# Patient Record
Sex: MALE | URBAN - METROPOLITAN AREA
[De-identification: names, ages, dates, MRNs, and addresses within clinical notes are randomized per-mention and may not be internally consistent; named-entity substitution may affect disease eponyms.]

---

## 2022-09-12 ENCOUNTER — APPOINTMENT (RX ONLY)
Dept: URBAN - METROPOLITAN AREA CLINIC 76 | Facility: CLINIC | Age: 62
Setting detail: DERMATOLOGY
End: 2022-09-12

## 2022-09-12 ENCOUNTER — RX ONLY (OUTPATIENT)
Age: 62
Setting detail: RX ONLY
End: 2022-09-12

## 2022-09-12 DIAGNOSIS — L82.1 OTHER SEBORRHEIC KERATOSIS: ICD-10-CM

## 2022-09-12 PROCEDURE — 99202 OFFICE O/P NEW SF 15 MIN: CPT

## 2022-09-12 PROCEDURE — ? COUNSELING

## 2022-09-12 RX ORDER — ADAPALENE 3 MG/G
GEL TOPICAL
Qty: 45 | Refills: 2 | Status: CANCELLED

## 2022-09-12 ASSESSMENT — LOCATION DETAILED DESCRIPTION DERM: LOCATION DETAILED: RIGHT MID-UPPER BACK

## 2022-09-12 ASSESSMENT — LOCATION SIMPLE DESCRIPTION DERM: LOCATION SIMPLE: RIGHT UPPER BACK

## 2022-09-12 ASSESSMENT — LOCATION ZONE DERM: LOCATION ZONE: TRUNK

## 2022-09-12 NOTE — HPI: SKIN LESION
What Type Of Note Output Would You Prefer (Optional)?: Standard Output
How Severe Is Your Skin Lesion?: mild
Has Your Skin Lesion Been Treated?: not been treated
Is This A New Presentation, Or A Follow-Up?: Skin Lesions
Additional History: Patient has some spots on his back he would like looked at

## 2023-07-24 ENCOUNTER — APPOINTMENT (RX ONLY)
Dept: URBAN - METROPOLITAN AREA CLINIC 76 | Facility: CLINIC | Age: 63
Setting detail: DERMATOLOGY
End: 2023-07-24

## 2023-07-24 DIAGNOSIS — F42.4 EXCORIATION (SKIN-PICKING) DISORDER: ICD-10-CM

## 2023-07-24 DIAGNOSIS — L82.1 OTHER SEBORRHEIC KERATOSIS: ICD-10-CM

## 2023-07-24 PROCEDURE — ? COUNSELING

## 2023-07-24 PROCEDURE — 99213 OFFICE O/P EST LOW 20 MIN: CPT

## 2023-07-24 PROCEDURE — ? TREATMENT REGIMEN

## 2023-08-23 ENCOUNTER — APPOINTMENT (RX ONLY)
Dept: URBAN - METROPOLITAN AREA CLINIC 76 | Facility: CLINIC | Age: 63
Setting detail: DERMATOLOGY
End: 2023-08-23

## 2023-08-23 DIAGNOSIS — F42.4 EXCORIATION (SKIN-PICKING) DISORDER: ICD-10-CM | Status: RESOLVING

## 2023-08-23 PROBLEM — L30.9 DERMATITIS, UNSPECIFIED: Status: ACTIVE | Noted: 2023-08-23

## 2023-08-23 PROCEDURE — ? TREATMENT REGIMEN

## 2023-08-23 PROCEDURE — ? COUNSELING

## 2023-08-23 PROCEDURE — 99212 OFFICE O/P EST SF 10 MIN: CPT

## 2023-08-23 NOTE — PROCEDURE: TREATMENT REGIMEN
Plan: Continue naltrexone under the care of his physiologist.  Patient to start using Aquaphor to open sore areas and keep covered with non stick pad.
Detail Level: Zone

## 2023-09-18 ENCOUNTER — APPOINTMENT (RX ONLY)
Dept: URBAN - METROPOLITAN AREA CLINIC 76 | Facility: CLINIC | Age: 63
Setting detail: DERMATOLOGY
End: 2023-09-18

## 2023-09-18 DIAGNOSIS — S31000A OPEN WOUND(S) (MULTIPLE) OF UNSPECIFIED SITE(S), WITHOUT MENTION OF COMPLICATION: ICD-10-CM

## 2023-09-18 PROBLEM — S91.002A UNSPECIFIED OPEN WOUND, LEFT ANKLE, INITIAL ENCOUNTER: Status: ACTIVE | Noted: 2023-09-18

## 2023-09-18 PROCEDURE — ? COUNSELING

## 2023-09-18 PROCEDURE — ? TREATMENT REGIMEN

## 2023-09-18 PROCEDURE — 99212 OFFICE O/P EST SF 10 MIN: CPT

## 2023-09-18 ASSESSMENT — LOCATION ZONE DERM: LOCATION ZONE: LEG

## 2023-09-18 ASSESSMENT — LOCATION DETAILED DESCRIPTION DERM: LOCATION DETAILED: LEFT ANKLE

## 2023-09-18 ASSESSMENT — LOCATION SIMPLE DESCRIPTION DERM: LOCATION SIMPLE: LEFT ANKLE

## 2023-09-18 NOTE — PROCEDURE: TREATMENT REGIMEN
Continue Regimen: Aquaphor and bandage daily
Plan: Patient reassured no signs active infection.
Detail Level: Simple

## 2025-02-04 NOTE — PROCEDURE: TREATMENT REGIMEN
Problem: Pain  Goal: Acceptable pain level achieved/maintained at rest using appropriate pain scale for the patient  2/4/2025 1216 by Teresa Rick RN  Outcome: Monitoring/Evaluating progress  2/4/2025 1216 by Teresa Rick RN  Outcome: Monitoring/Evaluating progress  Goal: Acceptable pain level achieved/maintained with activity using appropriate pain scale for the patient  2/4/2025 1216 by Teresa Rick RN  Outcome: Monitoring/Evaluating progress  2/4/2025 1216 by Teresa Rick RN  Outcome: Monitoring/Evaluating progress  Goal: Acceptable pain level achieved/maintained without oversedation  2/4/2025 1216 by Teresa Rick RN  Outcome: Monitoring/Evaluating progress  2/4/2025 1216 by Teresa Rick RN  Outcome: Monitoring/Evaluating progress     Problem: At Risk for Falls  Goal: Patient does not fall  2/4/2025 1216 by Teresa Rick RN  Outcome: Monitoring/Evaluating progress  2/4/2025 1216 by Teresa Rick RN  Outcome: Monitoring/Evaluating progress  Goal: Patient takes action to control fall-related risks  2/4/2025 1216 by Teresa Rick RN  Outcome: Monitoring/Evaluating progress  2/4/2025 1216 by Teresa Rick RN  Outcome: Monitoring/Evaluating progress     Problem: Total Joint Replacement  Goal: Vital signs are maintained within parameters  2/4/2025 1216 by Teresa Rick RN  Outcome: Monitoring/Evaluating progress  2/4/2025 1216 by Teresa Rick RN  Outcome: Monitoring/Evaluating progress  Goal: Mobility is maintained/achieved within the limitation specified by physician order  Description: Maintain modified weight bearing based on physician order. Hips Only-Initiate/maintain hip precautions.  2/4/2025 1216 by Teresa Rick RN  Outcome: Monitoring/Evaluating progress  2/4/2025 1216 by Teresa Rick RN  Outcome: Monitoring/Evaluating progress  Goal: Elimination status is maintained/returned to baseline  Description: Remove 
indwelling urinary catheter as soon as possible or collaborate with provider for order/reason for continued use.   2/4/2025 1216 by Teresa Rick RN  Outcome: Monitoring/Evaluating progress  2/4/2025 1216 by Teresa Rick RN  Outcome: Monitoring/Evaluating progress  Goal: Oral intake is resumed and tolerated  2/4/2025 1216 by Teresa Rick RN  Outcome: Monitoring/Evaluating progress  2/4/2025 1216 by Teresa Rick RN  Outcome: Monitoring/Evaluating progress  Goal: Verbalizes understanding of Total joint replacement  Description: Document on Patient Education Activity   2/4/2025 1216 by Teresa Rick RN  Outcome: Monitoring/Evaluating progress  2/4/2025 1216 by Teresa Rick RN  Outcome: Monitoring/Evaluating progress     Problem: Postoperative Care  Goal: Vital signs are maintained within parameters  2/4/2025 1216 by Teresa Rick RN  Outcome: Monitoring/Evaluating progress  2/4/2025 1216 by Teresa Rick RN  Outcome: Monitoring/Evaluating progress  Goal: Elimination status is maintained/returned to baseline  Description: Remove indwelling urinary catheter as soon as possible or collaborate with provider for order/reason for continued use.   2/4/2025 1216 by Teresa Rick RN  Outcome: Monitoring/Evaluating progress  2/4/2025 1216 by Teresa Rick RN  Outcome: Monitoring/Evaluating progress  Goal: Oral intake is resumed and tolerated  2/4/2025 1216 by Teresa Rick RN  Outcome: Monitoring/Evaluating progress  2/4/2025 1216 by Teresa Rick RN  Outcome: Monitoring/Evaluating progress  Goal: Activity level is resumed to level needed for d/c  2/4/2025 1216 by Teresa Rick RN  Outcome: Monitoring/Evaluating progress  2/4/2025 1216 by Teresa Rick RN  Outcome: Monitoring/Evaluating progress  Goal: Verbalizes understanding of postoperative care in the hospital and after d/c  Description: Document on Patient Education Activity  2/4/2025 
1216 by Teresa Rick, RN  Outcome: Monitoring/Evaluating progress  2/4/2025 1216 by Teresa Rick, RN  Outcome: Monitoring/Evaluating progress     
Plan: Continue naltrexone under the care of his physiologist.  Patient to start using Aquaphor to open sore areas and keep covered with non stick pad.
Detail Level: Zone